# Patient Record
Sex: MALE | Race: WHITE | NOT HISPANIC OR LATINO | Employment: OTHER | URBAN - METROPOLITAN AREA
[De-identification: names, ages, dates, MRNs, and addresses within clinical notes are randomized per-mention and may not be internally consistent; named-entity substitution may affect disease eponyms.]

---

## 2023-04-05 LAB
ANION GAP IN SER/PLAS: 14 MMOL/L (ref 10–20)
CALCIUM (MG/DL) IN SER/PLAS: 9.4 MG/DL (ref 8.6–10.3)
CARBON DIOXIDE, TOTAL (MMOL/L) IN SER/PLAS: 24 MMOL/L (ref 21–32)
CHLORIDE (MMOL/L) IN SER/PLAS: 104 MMOL/L (ref 98–107)
CREATININE (MG/DL) IN SER/PLAS: 0.85 MG/DL (ref 0.5–1.3)
GFR MALE: >90 ML/MIN/1.73M2
GLUCOSE (MG/DL) IN SER/PLAS: 105 MG/DL (ref 74–99)
POTASSIUM (MMOL/L) IN SER/PLAS: 3.8 MMOL/L (ref 3.5–5.3)
SODIUM (MMOL/L) IN SER/PLAS: 138 MMOL/L (ref 136–145)
UREA NITROGEN (MG/DL) IN SER/PLAS: 15 MG/DL (ref 6–23)

## 2023-04-19 ENCOUNTER — HOSPITAL ENCOUNTER (OUTPATIENT)
Dept: DATA CONVERSION | Facility: HOSPITAL | Age: 63
End: 2023-04-19
Attending: ORTHOPAEDIC SURGERY | Admitting: ORTHOPAEDIC SURGERY
Payer: COMMERCIAL

## 2023-04-19 DIAGNOSIS — S43.422A SPRAIN OF LEFT ROTATOR CUFF CAPSULE, INITIAL ENCOUNTER: ICD-10-CM

## 2023-04-19 DIAGNOSIS — S43.432A SUPERIOR GLENOID LABRUM LESION OF LEFT SHOULDER, INITIAL ENCOUNTER: ICD-10-CM

## 2023-04-19 DIAGNOSIS — Z86.711 PERSONAL HISTORY OF PULMONARY EMBOLISM: ICD-10-CM

## 2023-04-19 DIAGNOSIS — I10 ESSENTIAL (PRIMARY) HYPERTENSION: ICD-10-CM

## 2023-04-19 DIAGNOSIS — S46.012A STRAIN OF MUSCLE(S) AND TENDON(S) OF THE ROTATOR CUFF OF LEFT SHOULDER, INITIAL ENCOUNTER: ICD-10-CM

## 2023-04-19 DIAGNOSIS — Z88.0 ALLERGY STATUS TO PENICILLIN: ICD-10-CM

## 2023-04-19 DIAGNOSIS — Z85.46 PERSONAL HISTORY OF MALIGNANT NEOPLASM OF PROSTATE: ICD-10-CM

## 2023-04-19 DIAGNOSIS — Z95.0 PRESENCE OF CARDIAC PACEMAKER: ICD-10-CM

## 2023-09-07 VITALS — WEIGHT: 207.67 LBS | HEIGHT: 72 IN | BODY MASS INDEX: 28.13 KG/M2

## 2023-10-02 NOTE — OP NOTE
Post Operative Note:     PreOp Diagnosis: Left full-thickness rotator cuff  tear, labral tear   Post-Procedure Diagnosis: Left partial-thickness  rotator cuff tear, labral tear   Procedure: Left arthroscopic shoulder surgery debridement,  subacromial decompression/acromioplasty   Surgeon: DEL Coelho MD   Resident/Fellow/Other Assistant: ISA Hernandez SA   Anesthesia: General and regional   I.V. Fluids: Crystalloid   Estimated Blood Loss (mL): Minimal   Specimen: yes. Arthroscopic shavings   Complications: None   Findings: Left partial-thickness articular sided  rotator cuff tear, superior labral tear, partial biceps tear     Operative Report Dictated:  Dictation: not applicable - note contains Operative  Report   Operative Report:    This is a 62-year of age male. Patient is right hand dominant and has had persistent left shoulder pain for more than a year.  He has been through physical therapy.   He cannot use anti-inflammatories.  MRI was obtained through [facility] and showed evidence of tearing of the labrum, partial-thickness versus full-thickness rotator cuff tear.  As the patient was not improving wanted consider operative approach.   Discussion was undertaken regarding left arthroscopic shoulder surgery, debridement, subacromial decompression/acromioplasty and rotator cuff repair. Risks and benefits of nonoperative and operative treatment were reviewed. Patient wanted to proceed  with an operative approach. The potential limitations of the surgery were reviewed with patient. We discussed the potential for persistent shoulder pain and need for further surgery in the future. In regard to the operative procedure the patient was informed  of risks to include but not limited to persistent pain, recurrent tear, infection, wound complications, arthrofibrosis and the DVT, pulmonary embolus, neurovascular injury, complex regional pain syndrome, medical and anesthetic risks. All questions were  answered and  consent was obtained.    Patient was brought to the operating room where after induction of general anesthesia and interscalene block patient was placed in a beachchair position. Exam under anesthesia demonstrated passive elevation 180 degrees abduction to 90 degrees external  rotation in neutral to 60 degrees degrees and internal rotation to 80 degrees. The patient had no evidence of instability on examination. The patients left upper extremity was then prepped and draped in usual sterile fashion. The patient was given vancomycin  for prophylactic antibiotics. Timeout was taken confirming surgical site antibiotics instrumentation and fire risk.    60 cc of sterile saline were introduced into the glenohumeral joint via posterior approach. A 15 blade was used to make a posterior skin incision. Blunt dissection was performed with straight hemostat. The arthroscope was inserted bluntly into the glenohumeral  joint.  Anterior portal was established.  Arthroscopic evaluation demonstrated mild degenerative change of the glenohumeral joint.  Subscapularis was intact.  There is evidence of a superior labral tear as well as partial biceps tear.  In addition, there  was evidence of articular sided rotator cuff tear.  Via the anterior portal extensive debridement was performed of the articular sided rotator cuff tear, labral tear and biceps tear.  The area of the superior labrum was marked.  The arthroscope was then  placed in the subacromial space. A lateral portal was established with a size 5 Arthrex passport cannula. Bursectomy was performed. Coracoacromial ligament was released. Acromioplasty was performed. The rotator cuff demonstrated no evidence of bursal  sided rotator cuff tear. Final arthroscopic images were taken. The shoulder was copiously irrigated with inflow outflow cannulas. Arthroscopic instruments were removed. Portals were closed with 4-0 Monocryl subcuticular suture. Bacitracin Adaptic sterile  dressings  were applied. Patient was placed in a shoulder immobilizer and abduction pillow. Cryo/Cuff was applied to the shoulder. All sponge and instrument counts were correct at the end of the case. Patient was transferred the postanesthesia care unit  in stable condition.          Electronic Signatures:  Esau Coelho)  (Signed 19-Apr-2023 10:45)   Authored: Post Operative Note, Note Completion      Last Updated: 19-Apr-2023 10:45 by Esau Coelho)

## 2024-02-27 ENCOUNTER — OFFICE VISIT (OUTPATIENT)
Dept: SURGERY | Facility: CLINIC | Age: 64
End: 2024-02-27
Payer: COMMERCIAL

## 2024-02-27 VITALS
TEMPERATURE: 97.4 F | HEART RATE: 68 BPM | SYSTOLIC BLOOD PRESSURE: 144 MMHG | OXYGEN SATURATION: 96 % | RESPIRATION RATE: 16 BRPM | DIASTOLIC BLOOD PRESSURE: 92 MMHG | BODY MASS INDEX: 29.06 KG/M2 | WEIGHT: 207.6 LBS | HEIGHT: 71 IN

## 2024-02-27 DIAGNOSIS — M62.08 DIASTASIS RECTI: Primary | ICD-10-CM

## 2024-02-27 PROCEDURE — 99203 OFFICE O/P NEW LOW 30 MIN: CPT | Performed by: SURGERY

## 2024-02-27 RX ORDER — OMEPRAZOLE 40 MG/1
40 CAPSULE, DELAYED RELEASE ORAL
COMMUNITY
Start: 2020-07-01

## 2024-02-27 RX ORDER — RIVAROXABAN 20 MG/1
20 TABLET, FILM COATED ORAL
COMMUNITY
Start: 2018-01-15

## 2024-02-27 RX ORDER — TAMSULOSIN HYDROCHLORIDE 0.4 MG/1
0.4 CAPSULE ORAL DAILY
COMMUNITY

## 2024-02-27 RX ORDER — IBUPROFEN 600 MG/1
600 TABLET ORAL 3 TIMES DAILY
COMMUNITY
Start: 2017-04-05

## 2024-02-27 RX ORDER — ESCITALOPRAM OXALATE 20 MG/1
20 TABLET, FILM COATED ORAL DAILY
COMMUNITY
Start: 2016-07-05

## 2024-02-27 RX ORDER — EZETIMIBE 10 MG/1
10 TABLET ORAL DAILY
COMMUNITY
Start: 2021-08-09

## 2024-02-27 RX ORDER — METOPROLOL TARTRATE 50 MG/1
50 TABLET ORAL
COMMUNITY

## 2024-02-27 RX ORDER — ATORVASTATIN CALCIUM 40 MG/1
40 TABLET, FILM COATED ORAL DAILY
COMMUNITY
Start: 2015-05-28

## 2024-02-27 NOTE — PROGRESS NOTES
Elie Shearer   [id number]   1960         Chief Complaint/        Possible hernia    63-year-old male seen for possible hernia    Patient notes a bulge in the epigastric midline    This been going on for several years and seems to be slowly getting bigger.  Patient states that when he stands he does not appreciate the lump.  Patient states when he is lying flat he does not notice the lump.  However when the patient starts to do a sit up he notes a large bulge in the epigastric midline extending from the xiphoid to the umbilicus    The lump does not hurt.  Patient denies any prior surgery in this part of the body      HPI/        Past Medical History:   Diagnosis Date    Personal history of malignant neoplasm of prostate     History of malignant neoplasm of prostate   Chronic back pain    Dysrhythmia  Hypertension   Pacemaker  Chronic anticoagulation        Social History     Socioeconomic History    Marital status: Single     Spouse name: Not on file    Number of children: Not on file    Years of education: Not on file    Highest education level: Not on file   Occupational History    Not on file   Tobacco Use    Smoking status: Not on file    Smokeless tobacco: Not on file   Substance and Sexual Activity    Alcohol use: Not on file    Drug use: Not on file    Sexual activity: Not on file   Other Topics Concern    Not on file   Social History Narrative    Not on file     Social Determinants of Health     Financial Resource Strain: Not on file   Food Insecurity: Not on file   Transportation Needs: Not on file   Physical Activity: Not on file   Stress: Not on file   Social Connections: Not on file   Intimate Partner Violence: Not on file   Housing Stability: Not on file          No family history on file.     Review of Systems   All other systems reviewed and are negative.         Current Outpatient Medications:     atorvastatin (Lipitor) 40 mg tablet, Take 1 tablet (40 mg) by mouth once daily., Disp: , Rfl:      "ezetimibe (Zetia) 10 mg tablet, Take 1 tablet (10 mg) by mouth once daily., Disp: , Rfl:     ibuprofen 600 mg tablet, Take 1 tablet (600 mg) by mouth 3 times a day., Disp: , Rfl:     Lexapro 20 mg tablet, Take 1 tablet (20 mg) by mouth once daily., Disp: , Rfl:     metoprolol tartrate (Lopressor) 50 mg tablet, Take 1 tablet by mouth once daily., Disp: , Rfl:     omeprazole (PriLOSEC) 40 mg DR capsule, Take 1 capsule (40 mg) by mouth once daily in the morning. Take before meals., Disp: , Rfl:     tamsulosin (Flomax) 0.4 mg 24 hr capsule, Take 1 capsule (0.4 mg) by mouth once daily., Disp: , Rfl:     Xarelto 20 mg tablet, Take 1 tablet (20 mg) by mouth once daily in the evening. Take with meals., Disp: , Rfl:        yes      Xaralto  no      Eliquis  no      Coumadin  no      Plavix        Allergies   Allergen Reactions    Penicillins Anaphylaxis and Hives        Electrocardiogram 12 Lead  Normal sinus rhythm  Normal ECG  No previous ECGs available  Confirmed by Libia Noe ([id number]) on 4/20/2023 7:54:18 PM       I have reviewed the images and the reports      Laboratory data:   CBC:   Lab Results   Component Value Date    WBC 13.9 (H) 06/28/2022    RBC 3.63 (L) 06/28/2022    HGB 12.4 (L) 06/28/2022    HCT 36.1 (L) 06/28/2022     06/28/2022   ]   [facility] Labs:   Lab Results   Component Value Date     04/05/2023    K 3.8 04/05/2023     04/05/2023    CO2 24 04/05/2023    BUN 15 04/05/2023    CREATININE 0.85 04/05/2023    CALCIUM 9.4 04/05/2023          I have reviewed the above laboratory studies      BP (!) 144/92 (BP Location: Left arm, Patient Position: Sitting)   Pulse 68   Temp 36.3 °C (97.4 °F) (Temporal)   Resp 16   Ht 1.803 m (5' 11\")   Wt 94.2 kg (207 lb 9.6 oz)   SpO2 96%   BMI 28.95 kg/m²        Physical Exam  Abdominal:      Comments: Diastases recti noted    No umbilical hernia    No hernia in epigastric midline                  Assessment/    Diastases recti    Plan/    Difference " between diastases recti and a true hernias reviewed with the patient    Reviewed with the patient that no surgery is needed for this disorder.